# Patient Record
Sex: MALE | Race: WHITE | NOT HISPANIC OR LATINO | Employment: UNEMPLOYED | URBAN - METROPOLITAN AREA
[De-identification: names, ages, dates, MRNs, and addresses within clinical notes are randomized per-mention and may not be internally consistent; named-entity substitution may affect disease eponyms.]

---

## 2022-07-01 ENCOUNTER — APPOINTMENT (OUTPATIENT)
Dept: GENERAL RADIOLOGY | Facility: HOSPITAL | Age: 47
End: 2022-07-01

## 2022-07-01 ENCOUNTER — HOSPITAL ENCOUNTER (EMERGENCY)
Facility: HOSPITAL | Age: 47
Discharge: HOME | End: 2022-07-01
Attending: EMERGENCY MEDICINE | Admitting: EMERGENCY MEDICINE

## 2022-07-01 VITALS
TEMPERATURE: 98.2 F | DIASTOLIC BLOOD PRESSURE: 93 MMHG | BODY MASS INDEX: 25.73 KG/M2 | HEIGHT: 72 IN | SYSTOLIC BLOOD PRESSURE: 142 MMHG | WEIGHT: 190 LBS | HEART RATE: 66 BPM | OXYGEN SATURATION: 100 % | RESPIRATION RATE: 18 BRPM

## 2022-07-01 DIAGNOSIS — M54.50 ACUTE BILATERAL LOW BACK PAIN WITHOUT SCIATICA: Primary | ICD-10-CM

## 2022-07-01 PROCEDURE — 99283 EMERGENCY DEPT VISIT LOW MDM: CPT

## 2022-07-01 PROCEDURE — 72100 X-RAY EXAM L-S SPINE 2/3 VWS: CPT

## 2022-07-01 RX ORDER — PREDNISONE 20 MG/1
TABLET ORAL
Qty: 18 TABLET | Refills: 0 | Status: SHIPPED | OUTPATIENT
Start: 2022-07-01 | End: 2022-07-10

## 2022-07-01 RX ORDER — IBUPROFEN 400 MG/1
800 TABLET ORAL ONCE
Status: COMPLETED | OUTPATIENT
Start: 2022-07-01 | End: 2022-07-01

## 2022-07-01 RX ADMIN — IBUPROFEN 800 MG: 400 TABLET, FILM COATED ORAL at 08:59

## 2022-07-01 NOTE — ED PROVIDER NOTES
Subjective   Patient is a 46-year-old male that presents to emergency department today, via POV, complaining of lower back pain.  Patient states his pain is worse after laying flat for period of time.  He reports his pain started approximately 2 days ago.  He states he has been doing some lifting helping his mom cleaning out a shed.  Additionally he has been working at [employer] and was lifting some on the job.  He rates his current pain and discomfort as a 1 on a scale of 0-10.  He states however when he attempts to rise from a lying position it increases to a 7.  He denies any known specific injury, denies any involuntary loss of bowel or bladder, and has no signs and symptoms suggesting saddle paresthesia      History provided by:  Patient   used: No        Review of Systems   Constitutional: Negative for chills and fever.   HENT: Negative for congestion, ear pain and sore throat.    Eyes: Negative for pain.   Respiratory: Negative for cough, chest tightness and shortness of breath.    Cardiovascular: Negative for chest pain.   Gastrointestinal: Negative for abdominal pain, diarrhea, nausea and vomiting.   Genitourinary: Negative for flank pain and hematuria.   Musculoskeletal: Positive for back pain. Negative for joint swelling.   Skin: Negative for pallor.   Neurological: Negative for seizures and headaches.   All other systems reviewed and are negative.      No past medical history on file.    No Known Allergies    No past surgical history on file.    No family history on file.    Social History     Socioeconomic History   • Marital status: Legally            Objective   Physical Exam  Vitals and nursing note reviewed.   Constitutional:       General: He is not in acute distress.     Appearance: Normal appearance. He is not toxic-appearing.   HENT:      Head: Normocephalic and atraumatic.      Mouth/Throat:      Mouth: Mucous membranes are moist.   Eyes:      General: No scleral  icterus.     Extraocular Movements: Extraocular movements intact.      Pupils: Pupils are equal, round, and reactive to light.   Cardiovascular:      Rate and Rhythm: Normal rate and regular rhythm.      Pulses: Normal pulses.      Heart sounds: Normal heart sounds.   Pulmonary:      Effort: Pulmonary effort is normal. No respiratory distress.      Breath sounds: Normal breath sounds. No stridor. No wheezing.   Abdominal:      General: Abdomen is flat.      Palpations: Abdomen is soft.      Tenderness: There is no abdominal tenderness.   Musculoskeletal:         General: No swelling, tenderness, deformity or signs of injury. Normal range of motion.      Cervical back: Normal range of motion and neck supple.   Skin:     General: Skin is warm and dry.   Neurological:      Mental Status: He is alert and oriented to person, place, and time. Mental status is at baseline.      Cranial Nerves: No cranial nerve deficit.      Sensory: No sensory deficit.      Motor: No weakness.      Coordination: Coordination normal.         Procedures           ED Course                                          Medications   ibuprofen (ADVIL,MOTRIN) tablet 800 mg (800 mg Oral Given 7/1/22 0859)       MDM  Number of Diagnoses or Management Options  Acute bilateral low back pain without sciatica: new and does not require workup  Diagnosis management comments: The patient´s symptoms are consistent with musculoskeletal back pain. The patient is now resting comfortably, feels better, is alert, talkative, interactive and in no distress. The repeat examination is unremarkable and benign. The patient is neurologically intact and is ambulatory in the ED. The patient has no fever, no bowel or bladder incontinence, no saddle anesthesia, and is otherwise alert and well appearing. The history, physical exam, and diagnostics (if any) do not suggest the presence of acute spinal epidural abscess, acute spinal epidural bleed, cauda equina syndrome,  abdominal aortic aneurysm, aortic dissection or other process requiring further testing, treatment or consultation in the emergency department. The vital signs have been stable. The patient's condition is stable and appropriate for discharge. The patient will pursue further outpatient evaluation with the primary care physician or other designated for consulting position as indicated in the discharge instructions.         Amount and/or Complexity of Data Reviewed  Tests in the radiology section of CPT®: ordered and reviewed  Review and summarize past medical records: yes (I have personally reviewed patient's previous medical encounters.  )    Risk of Complications, Morbidity, and/or Mortality  Presenting problems: low  Diagnostic procedures: low  Management options: low    Patient Progress  Patient progress: stable      Final diagnoses:   Acute bilateral low back pain without sciatica       ED Disposition  ED Disposition     ED Disposition   Discharge    Condition   Stable    Comment   --             Provider, No Known  [facility]  [place] [id number]    In 1 week  As needed, If symptoms worsen         Medication List      New Prescriptions    diclofenac 50 MG EC tablet  Commonly known as: VOLTAREN  Take 1 tablet by mouth 3 (Three) Times a Day.     predniSONE 20 MG tablet  Commonly known as: DELTASONE  Take 1 tablet by mouth 3 (Three) Times a Day With Meals for 3 days, THEN 1 tablet 2 (Two) Times a Day for 3 days, THEN 1 tablet Daily for 3 days.  Start taking on: July 1, 2022           Where to Get Your Medications      These medications were sent to [facility] - [facility] - [address] [place] [address], [address] - [phone] [facility] [phone]   [address], [address], [place] [address]    Phone: [phone]   · diclofenac 50 MG EC tablet  · predniSONE 20 MG tablet          Mame Walker, APRN  07/01/22 0949

## 2022-07-01 NOTE — DISCHARGE INSTRUCTIONS
Please select a primary care provider from the list has been provided for you today and establish care with them.  Please take medications as directed.  Return to the ER if you develop any involuntary loss of bowel or bladder, numbness and tingling to your groin area, or have worsening of pain and become unable to ambulate.

## 2024-07-25 ENCOUNTER — HOSPITAL ENCOUNTER (EMERGENCY)
Facility: HOSPITAL | Age: 49
Discharge: HOME | End: 2024-07-25
Attending: EMERGENCY MEDICINE
Payer: COMMERCIAL

## 2024-07-25 ENCOUNTER — APPOINTMENT (OUTPATIENT)
Dept: CT IMAGING | Facility: HOSPITAL | Age: 49
End: 2024-07-25
Payer: COMMERCIAL

## 2024-07-25 VITALS
TEMPERATURE: 97.9 F | HEIGHT: 72 IN | DIASTOLIC BLOOD PRESSURE: 92 MMHG | OXYGEN SATURATION: 97 % | WEIGHT: 164.46 LBS | RESPIRATION RATE: 18 BRPM | BODY MASS INDEX: 22.28 KG/M2 | HEART RATE: 70 BPM | SYSTOLIC BLOOD PRESSURE: 126 MMHG

## 2024-07-25 DIAGNOSIS — K64.9 BLEEDING HEMORRHOID: Primary | ICD-10-CM

## 2024-07-25 LAB
ABO GROUP BLD: NORMAL
ALBUMIN SERPL-MCNC: 4.3 G/DL (ref 3.5–5.2)
ALBUMIN/GLOB SERPL: 1.2 G/DL
ALP SERPL-CCNC: 80 U/L (ref 39–117)
ALT SERPL W P-5'-P-CCNC: 10 U/L (ref 1–41)
ANION GAP SERPL CALCULATED.3IONS-SCNC: 11.5 MMOL/L (ref 5–15)
AST SERPL-CCNC: 15 U/L (ref 1–40)
BASOPHILS # BLD AUTO: 0.03 10*3/MM3 (ref 0–0.2)
BASOPHILS NFR BLD AUTO: 0.5 % (ref 0–1.5)
BILIRUB SERPL-MCNC: 0.3 MG/DL (ref 0–1.2)
BLD GP AB SCN SERPL QL: NEGATIVE
BUN SERPL-MCNC: 14 MG/DL (ref 6–20)
BUN/CREAT SERPL: 12.4 (ref 7–25)
CALCIUM SPEC-SCNC: 9.5 MG/DL (ref 8.6–10.5)
CHLORIDE SERPL-SCNC: 104 MMOL/L (ref 98–107)
CO2 SERPL-SCNC: 21.5 MMOL/L (ref 22–29)
CREAT SERPL-MCNC: 1.13 MG/DL (ref 0.76–1.27)
DEPRECATED RDW RBC AUTO: 45.6 FL (ref 37–54)
EGFRCR SERPLBLD CKD-EPI 2021: 79.7 ML/MIN/1.73
EOSINOPHIL # BLD AUTO: 0.02 10*3/MM3 (ref 0–0.4)
EOSINOPHIL NFR BLD AUTO: 0.3 % (ref 0.3–6.2)
ERYTHROCYTE [DISTWIDTH] IN BLOOD BY AUTOMATED COUNT: 13.6 % (ref 12.3–15.4)
GLOBULIN UR ELPH-MCNC: 3.6 GM/DL
GLUCOSE SERPL-MCNC: 93 MG/DL (ref 65–99)
HCT VFR BLD AUTO: 45.9 % (ref 37.5–51)
HGB BLD-MCNC: 15.5 G/DL (ref 13–17.7)
HOLD SPECIMEN: NORMAL
HOLD SPECIMEN: NORMAL
IMM GRANULOCYTES # BLD AUTO: 0.01 10*3/MM3 (ref 0–0.05)
IMM GRANULOCYTES NFR BLD AUTO: 0.2 % (ref 0–0.5)
LYMPHOCYTES # BLD AUTO: 1.6 10*3/MM3 (ref 0.7–3.1)
LYMPHOCYTES NFR BLD AUTO: 24.8 % (ref 19.6–45.3)
MCH RBC QN AUTO: 30.5 PG (ref 26.6–33)
MCHC RBC AUTO-ENTMCNC: 33.8 G/DL (ref 31.5–35.7)
MCV RBC AUTO: 90.4 FL (ref 79–97)
MONOCYTES # BLD AUTO: 1.34 10*3/MM3 (ref 0.1–0.9)
MONOCYTES NFR BLD AUTO: 20.8 % (ref 5–12)
NEUTROPHILS NFR BLD AUTO: 3.44 10*3/MM3 (ref 1.7–7)
NEUTROPHILS NFR BLD AUTO: 53.4 % (ref 42.7–76)
NRBC BLD AUTO-RTO: 0 /100 WBC (ref 0–0.2)
PLATELET # BLD AUTO: 232 10*3/MM3 (ref 140–450)
PMV BLD AUTO: 9.1 FL (ref 6–12)
POTASSIUM SERPL-SCNC: 4.2 MMOL/L (ref 3.5–5.2)
PROT SERPL-MCNC: 7.9 G/DL (ref 6–8.5)
RBC # BLD AUTO: 5.08 10*6/MM3 (ref 4.14–5.8)
RH BLD: POSITIVE
SODIUM SERPL-SCNC: 137 MMOL/L (ref 136–145)
T&S EXPIRATION DATE: NORMAL
WBC NRBC COR # BLD AUTO: 6.44 10*3/MM3 (ref 3.4–10.8)
WHOLE BLOOD HOLD COAG: NORMAL
WHOLE BLOOD HOLD SPECIMEN: NORMAL

## 2024-07-25 PROCEDURE — 86901 BLOOD TYPING SEROLOGIC RH(D): CPT | Performed by: EMERGENCY MEDICINE

## 2024-07-25 PROCEDURE — 80053 COMPREHEN METABOLIC PANEL: CPT | Performed by: EMERGENCY MEDICINE

## 2024-07-25 PROCEDURE — 86900 BLOOD TYPING SEROLOGIC ABO: CPT | Performed by: EMERGENCY MEDICINE

## 2024-07-25 PROCEDURE — 25510000001 IOPAMIDOL PER 1 ML: Performed by: EMERGENCY MEDICINE

## 2024-07-25 PROCEDURE — 74177 CT ABD & PELVIS W/CONTRAST: CPT

## 2024-07-25 PROCEDURE — 86850 RBC ANTIBODY SCREEN: CPT | Performed by: EMERGENCY MEDICINE

## 2024-07-25 PROCEDURE — 99285 EMERGENCY DEPT VISIT HI MDM: CPT

## 2024-07-25 PROCEDURE — 85025 COMPLETE CBC W/AUTO DIFF WBC: CPT | Performed by: EMERGENCY MEDICINE

## 2024-07-25 RX ORDER — POLYETHYLENE GLYCOL 3350 17 G/17G
17 POWDER, FOR SOLUTION ORAL DAILY
Qty: 24 EACH | Refills: 1 | Status: SHIPPED | OUTPATIENT
Start: 2024-07-25

## 2024-07-25 RX ORDER — SODIUM CHLORIDE 0.9 % (FLUSH) 0.9 %
10 SYRINGE (ML) INJECTION AS NEEDED
Status: DISCONTINUED | OUTPATIENT
Start: 2024-07-25 | End: 2024-07-25 | Stop reason: HOSPADM

## 2024-07-25 RX ORDER — COCOA BUTTER, PHENYLEPHRINE HYDROCHLORIDE 2211; 6.25 MG/1; MG/1
1 SUPPOSITORY RECTAL AS NEEDED
Qty: 24 SUPPOSITORY | Refills: 0 | Status: SHIPPED | OUTPATIENT
Start: 2024-07-25

## 2024-07-25 RX ADMIN — IOPAMIDOL 75 ML: 755 INJECTION, SOLUTION INTRAVENOUS at 10:58

## 2024-07-25 NOTE — ED PROVIDER NOTES
"Time: 9:26 AM EDT  Date of encounter:  7/25/2024  Independent Historian/Clinical History and Information was obtained by:   Patient    History is limited by: N/A    Chief Complaint: Rectal bleeding      History of Present Illness:  Patient is a 48 y.o. year old male who presents to the emergency department for evaluation of rectal bleeding.  Patient reports that he has been straining to poop over the last week and noted that he had blood in the stool last night.  States that it feels up the toilet.  Has not had any bleeding since that time.  Denies any other issues.  Does report he has been constipated.    HPI    Patient Care Team  Primary Care Provider: Provider, No Known    Past Medical History:     No Known Allergies  History reviewed. No pertinent past medical history.  History reviewed. No pertinent surgical history.  History reviewed. No pertinent family history.    Home Medications:  Prior to Admission medications    Medication Sig Start Date End Date Taking? Authorizing Provider   diclofenac (VOLTAREN) 50 MG EC tablet Take 1 tablet by mouth 3 (Three) Times a Day. 7/1/22   Mame Walker APRN        Social History:          Review of Systems:  Review of Systems   Gastrointestinal:  Positive for blood in stool.        Physical Exam:  /92   Pulse 70   Temp 97.9 °F (36.6 °C) (Oral)   Resp 18   Ht 182.9 cm (72\")   Wt 74.6 kg (164 lb 7.4 oz)   SpO2 97%   BMI 22.31 kg/m²     Physical Exam  Vitals and nursing note reviewed.   Constitutional:       Appearance: Normal appearance.   HENT:      Head: Normocephalic and atraumatic.   Eyes:      General: No scleral icterus.  Cardiovascular:      Rate and Rhythm: Normal rate.   Pulmonary:      Effort: Pulmonary effort is normal.   Abdominal:      Palpations: Abdomen is soft.      Tenderness: There is no abdominal tenderness.   Genitourinary:     Comments: There is a large hemorrhoid with a significant amount of stigmata of bleeding externally at 9:00.  " There is no active bleeding noted.  There is blood around the rectum noted.  On internal exam there is no blood noted within the vault.  Musculoskeletal:         General: Normal range of motion.      Cervical back: Normal range of motion.   Skin:     Findings: No rash.   Neurological:      General: No focal deficit present.      Mental Status: He is alert.                  Procedures:  Procedures      Medical Decision Making:      Comorbidities that affect care:    None    External Notes reviewed:    Reviewed ED note from 7/1/2022      The following orders were placed and all results were independently analyzed by me:  Orders Placed This Encounter   Procedures    CT Abdomen Pelvis With Contrast    [facility] Draw    Comprehensive Metabolic Panel    CBC Auto Differential    NPO Diet NPO Type: Strict NPO    Undress & Gown    Measure Blood Pressure    Vital Signs    Orthostatic Blood Pressure    Pulse Oximetry    Oxygen Therapy- Nasal Cannula; Titrate 1-6 LPM Per SpO2; 90 - 95%    Type & Screen    ABO RH Specimen Verification    Insert Peripheral IV    CBC & Differential    Green Top (Gel)    Lavender Top    Gold Top - SST    Light Blue Top       Medications Given in the Emergency Department:  Medications   sodium chloride 0.9 % flush 10 mL (has no administration in time range)   iopamidol (ISOVUE-370) 76 % injection 100 mL (75 mL Intravenous Given 7/25/24 1058)        ED Course:         Labs:    Lab Results (last 24 hours)       Procedure Component Value Units Date/Time    CBC & Differential [[id number]]  (Abnormal) Collected: 07/25/24 0948    Specimen: Blood Updated: 07/25/24 1029    Narrative:      The following orders were created for panel order CBC & Differential.  Procedure                               Abnormality         Status                     ---------                               -----------         ------                     CBC Auto Differential[[id number]]        Abnormal            Final result                  Please view results for these tests on the individual orders.    Comprehensive Metabolic Panel [[id number]]  (Abnormal) Collected: 07/25/24 0948    Specimen: Blood Updated: 07/25/24 1024     Glucose 93 mg/dL      BUN 14 mg/dL      Creatinine 1.13 mg/dL      Sodium 137 mmol/L      Potassium 4.2 mmol/L      Chloride 104 mmol/L      CO2 21.5 mmol/L      Calcium 9.5 mg/dL      Total Protein 7.9 g/dL      Albumin 4.3 g/dL      ALT (SGPT) 10 U/L      AST (SGOT) 15 U/L      Alkaline Phosphatase 80 U/L      Total Bilirubin 0.3 mg/dL      Globulin 3.6 gm/dL      A/G Ratio 1.2 g/dL      BUN/Creatinine Ratio 12.4     Anion Gap 11.5 mmol/L      eGFR 79.7 mL/min/1.73     Narrative:      GFR Normal >60  Chronic Kidney Disease <60  Kidney Failure <15      CBC Auto Differential [[id number]]  (Abnormal) Collected: 07/25/24 0948    Specimen: Blood Updated: 07/25/24 1029     WBC 6.44 10*3/mm3      RBC 5.08 10*6/mm3      Hemoglobin 15.5 g/dL      Hematocrit 45.9 %      MCV 90.4 fL      MCH 30.5 pg      MCHC 33.8 g/dL      RDW 13.6 %      RDW-SD 45.6 fl      MPV 9.1 fL      Platelets 232 10*3/mm3      Neutrophil % 53.4 %      Lymphocyte % 24.8 %      Monocyte % 20.8 %      Eosinophil % 0.3 %      Basophil % 0.5 %      Immature Grans % 0.2 %      Neutrophils, Absolute 3.44 10*3/mm3      Lymphocytes, Absolute 1.60 10*3/mm3      Monocytes, Absolute 1.34 10*3/mm3      Eosinophils, Absolute 0.02 10*3/mm3      Basophils, Absolute 0.03 10*3/mm3      Immature Grans, Absolute 0.01 10*3/mm3      nRBC 0.0 /100 WBC              Imaging:    CT Abdomen Pelvis With Contrast    Result Date: 7/25/2024  CT ABDOMEN PELVIS W CONTRAST Date of Exam: 7/25/2024 10:52 AM EDT Indication: Rectal bleeding. Comparison: None available. Technique: Axial CT images were obtained of the abdomen and pelvis after the uneventful intravenous administration of iodinated contrast. Reconstructed coronal and sagittal images were also obtained. Automated exposure control and  iterative construction methods were used. Please note this is a motion-degraded examination. Findings: Lower thorax: Liver: 1 cm hypoattenuating lesion in the superior right hepatic lobe (series 7 image 33). No additional focal liver lesions are identified. Normal morphology of the liver. Gallbladder and bile ducts: Gallbladder is unremarkable. No biliary ductal dilatation. Pancreas: No pancreatic duct dilation. No surrounding inflammation. Spleen: Normal in size. Adrenal glands: 1.0 cm left adrenal nodule. No right adrenal nodule. Kidneys: Symmetric in size and enhancement. No hydronephrosis. 1.2 cm hypoattenuating homogenous lesion in the upper pole the left kidney, likely a cyst. Normal excretion of contrast on delayed phase. Urinary bladder: Unremarkable. Reproductive organs: Unremarkable. Stomach and bowel: No evidence of bowel obstruction. Normal appendix. No significant diverticular disease. Lymph nodes: No pathologically enlarged lymph nodes. Vessels: No abdominal aortic aneurysm. Major vasculature is patent. Peritoneum and retroperitoneum: No free air or free fluid. Soft tissues: Unremarkable. Osseous structures: No acute or suspicious osseous lesions.     Impression: No acute findings in the abdomen or pelvis. 1 cm hypoattenuating liver lesion, unable to be characterized on this postcontrast only exam, but favored to represent a cyst or hemangioma in the absence of known malignancy. 1 cm left adrenal nodule, unable to be characterized on this postcontrast only exam, but statistically represents an adenoma. 1.2 cm hypoattenuating left renal lesion, unable to be characterized on this postcontrast only exam, but favored to represent a cyst. Electronically Signed: Abraham Rosales MD  7/25/2024 11:19 AM EDT  Workstation ID: [id number]       Differential Diagnosis and Discussion:    GI Bleeding: Differential diagnosis includes but is not limited to gastritis, gastric ulcer, stress ulcer, duodenitis, Carla-Noguera  tears, esophageal varices, angiodysplasia, aortic enteric fistula, hematologic issues including thrombocytopenia, GI neoplasm, ulcerative colitis, Crohn's disease, diverticulosis, diverticulitis, hemorrhoids, aortic aneurysm, and polyps    All labs were reviewed and interpreted by me.  CT scan radiology impression was interpreted by me.    MDM     Patient is a 48-year-old who presents with complaints of rectal bleeding.  Found to have a bleeding hemorrhoid.  He also reports that he has been straining recently.  Recommending conservative therapy for the hemorrhoidal treatment with Preparation H, Proctofoam, stool softeners.  Will need to be followed up by primary care doctor.  He is otherwise stable at this time.  CT does not show any acute findings but does have some incidental findings which I talked to the patient about.  Will need to be seen as an outpatient.  Will DC.          Patient Care Considerations:          Consultants/Shared Management Plan:    None    Social Determinants of Health:    Patient is independent, reliable, and has access to care.       Disposition and Care Coordination:    Discharged: The patient is suitable and stable for discharge with no need for consideration of admission.    I have explained the patient´s condition, diagnoses and treatment plan based on the information available to me at this time. I have answered questions and addressed any concerns. The patient has a good  understanding of the patient´s diagnosis, condition, and treatment plan as can be expected at this point. The vital signs have been stable. The patient´s condition is stable and appropriate for discharge from the emergency department.      The patient will pursue further outpatient evaluation with the primary care physician or other designated or consulting physician as outlined in the discharge instructions. They are agreeable to this plan of care and follow-up instructions have been explained in detail. The  patient has received these instructions in written format and have expressed an understanding of the discharge instructions. The patient is aware that any significant change in condition or worsening of symptoms should prompt an immediate return to this or the closest emergency department or call to 911.      Final diagnoses:   Bleeding hemorrhoid        ED Disposition       ED Disposition   Discharge    Condition   Stable    Comment   --               This medical record created using voice recognition software.             Nikhil Tilley MD  07/25/24 8925